# Patient Record
(demographics unavailable — no encounter records)

---
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
## 2018-09-04 NOTE — ER DOCUMENT REPORT
Doctor's Note


Notes: 





09/04/18 17:36


I personally and independently obtained patient history and examined the 

patient and have reviewed the APC's note, reviewed, discussed and agree with 

their assessment and plan.





HISTORY OF PRESENT ILLNESS:


Patient is a 21-year-old male that presents to the emergency department for 

chief complaint of rash under his left armpit.  He rated his pain as a 3 out of 

10 at this time, in the right axilla, sharp occasion, worse with palpation.  No 

relieving factors.  Patient reports he did shave underneath his armpit thinking 

it might help.





ROS:


Constitutional: Negative for fever.


Cardiovascular: Negative for chest pain.


Respiratory: Negative for shortness of breath.


Gastrointestinal: Negative for vomiting or abdominal pain 


Musculoskeletal: Negative for arm, leg or back pain


Skin: Positive for rash


Neurological: Negative for weakness or numbness.





Unless otherwise stated in this report the patient's positive and negative 

responses for review of systems for constitutional, eyes, ENT, cardiovascular, 

respiratory, gastrointestinal, neurological, genitourinary, musculoskeletal, 

and integumentary systems and related systems to the presenting problem are 

either as stated in the HPI or were not pertinent or were negative for the 

symptoms and/or complaints related to the presenting medical problem. 








PHYSICAL EXAMINATION:





Vital signs reviewed, nursing noted reviewed. 





GENERAL: Well-appearing, well-nourished and in no acute distress.





HEAD: Atraumatic, normocephalic.





EYES: Eyes appear normal, conjunctiva are normal.





ENT: nares patent, oropharynx clear without exudates.  Moist mucous membranes.





NECK: Normal range of motion, supple without lymphadenopathy





LUNGS: Breath sounds clear to auscultation bilaterally and equal.  No wheezes 

rales or rhonchi.





HEART: Regular rate and rhythm without murmurs





ABDOMEN: Soft, nontender, normoactive bowel sounds.  No rebound, guarding, or 

rigidity. No masses appreciated.





EXTREMITIES: Nontender, good range of motion, no pitting or edema.  





NEUROLOGICAL: No focal neurological deficits. Moves all extremities 

spontaneously Motor and sensory grossly intact on exam.





PSYCH: Normal mood, normal affect.





SKIN: Warm, Dry, normal turgor, patient noted to have a vesicular rash in the 

right axilla, with some tenderness with palpation, most consistent with herpes 

zoster infection.





MEDICAL DECISION MAKING:





Patient was seen and examined, his  skin lesion seem to most consistent with a 

herpes zoster infection, will treat accordingly, with valacyclovir, and have 

him follow-up with a primary care physician.





Please review detail APC documentation. 





*Note is created using voice recognition software and may contain spelling, 

syntax or grammatical errors.